# Patient Record
(demographics unavailable — no encounter records)

---

## 2024-11-05 NOTE — PHYSICAL EXAM
[Delayed in the Right Toes] : capillary refills delayed in the right toes [Delayed in the Left Toes] : capillary refills delayed in the left toes [0] : left foot dorsalis pedis 0 [de-identified] : Semi-rigid arthritic hammertoe deformity. S/P left hallux amputation. Intrinsic atrophy. Peripheral neuropathy.  [FreeTextEntry4] : absent [FreeTextEntry8] : absent [FreeTextEntry1] : [place] monofilament testing absent at the hallux, 1st MPJ and heel bilateral. The patient has a class finding of Q9-One Class B and 2 Class C findings.

## 2024-11-05 NOTE — PHYSICAL EXAM
[Delayed in the Right Toes] : capillary refills delayed in the right toes [Delayed in the Left Toes] : capillary refills delayed in the left toes [0] : left foot dorsalis pedis 0 [de-identified] : Semi-rigid arthritic hammertoe deformity. S/P left hallux amputation. Intrinsic atrophy. Peripheral neuropathy.  [FreeTextEntry4] : absent [FreeTextEntry8] : absent [FreeTextEntry1] : [place] monofilament testing absent at the hallux, 1st MPJ and heel bilateral. The patient has a class finding of Q9-One Class B and 2 Class C findings.

## 2024-11-05 NOTE — HISTORY OF PRESENT ILLNESS
[FreeTextEntry1] : Patient presents today complaining of worsening onychomycotic nails and keratosis on a high-risk foot due to neuropathy.  The patient's history and physical has been reviewed and verified with no changes.

## 2024-11-05 NOTE — PHYSICAL EXAM
[Delayed in the Right Toes] : capillary refills delayed in the right toes [Delayed in the Left Toes] : capillary refills delayed in the left toes [0] : left foot dorsalis pedis 0 [de-identified] : Semi-rigid arthritic hammertoe deformity. S/P left hallux amputation. Intrinsic atrophy. Peripheral neuropathy.  [FreeTextEntry4] : absent [FreeTextEntry8] : absent [FreeTextEntry1] : [place] monofilament testing absent at the hallux, 1st MPJ and heel bilateral. The patient has a class finding of Q9-One Class B and 2 Class C findings.

## 2024-11-05 NOTE — ASSESSMENT
[FreeTextEntry1] : Impression: Onychomycosis. Keratosis. Diabetic neuropathy. Pain.  Treatment: I sharply trimmed keratotic lesions x2 after prepping with alcohol. I manually and mechanically debrided mycotic nails x8 using a small straight nail splitter and rotary sabi. The nails were aggressively debrided and debulked to make them comfortable in shoe gear.  I applied Naftin gel. Failure to perform this treatment based on patient's underlying condition could lead to ulceration and infection.  I discussed diabetic foot care and diabetic neuropathy. I want her to get memory foam shoes. Follow-up in 3 months.